# Patient Record
Sex: MALE | Race: BLACK OR AFRICAN AMERICAN | NOT HISPANIC OR LATINO | URBAN - METROPOLITAN AREA
[De-identification: names, ages, dates, MRNs, and addresses within clinical notes are randomized per-mention and may not be internally consistent; named-entity substitution may affect disease eponyms.]

---

## 2024-07-22 ENCOUNTER — EMERGENCY (EMERGENCY)
Facility: HOSPITAL | Age: 64
LOS: 1 days | Discharge: HOME | End: 2024-07-22
Attending: STUDENT IN AN ORGANIZED HEALTH CARE EDUCATION/TRAINING PROGRAM
Payer: COMMERCIAL

## 2024-07-22 VITALS
WEIGHT: 195.11 LBS | HEART RATE: 55 BPM | TEMPERATURE: 98 F | HEIGHT: 72 IN | RESPIRATION RATE: 18 BRPM | SYSTOLIC BLOOD PRESSURE: 142 MMHG | OXYGEN SATURATION: 99 % | DIASTOLIC BLOOD PRESSURE: 67 MMHG

## 2024-07-22 PROCEDURE — 99053 MED SERV 10PM-8AM 24 HR FAC: CPT

## 2024-07-22 PROCEDURE — 99284 EMERGENCY DEPT VISIT MOD MDM: CPT

## 2024-07-22 PROCEDURE — 99283 EMERGENCY DEPT VISIT LOW MDM: CPT

## 2024-07-22 RX ORDER — ACETAMINOPHEN 325 MG
975 TABLET ORAL ONCE
Refills: 0 | Status: COMPLETED | OUTPATIENT
Start: 2024-07-22 | End: 2024-07-22

## 2024-07-22 RX ORDER — LIDOCAINE HCL 28 MG/G
1 GEL TOPICAL ONCE
Refills: 0 | Status: COMPLETED | OUTPATIENT
Start: 2024-07-22 | End: 2024-07-22

## 2024-07-22 RX ADMIN — LIDOCAINE HCL 1 PATCH: 28 GEL TOPICAL at 05:56

## 2024-07-22 NOTE — ED PROVIDER NOTE - IN ACCORDANCE WITH [place] STATE LAW, WE OFFER EVERY PATIENT A HEPATITIS C TEST. WOULD YOU LIKE TO BE TESTED TODAY?
Care discussed with pharmacist at approximately 3:10 PM.  Call to and message left for the patient at approximately 3:18 PM.  Requesting callback from the patient.
Discussed with pharmacy.  The patient has intermediate sensitivity of the ciprofloxacin and she was discharged on levofloxacin.  Pharmacy recommending that if she is improving that she can finish the course of Levaquin and that if she is not improving adequately that she can use Keflex 500 4 times daily for 10 days.  Call to the patient and message left and then call back for the patient and reviewed with her at approximately 4:15 PM.  She describes that she was feeling nauseous but that is improved and no longer nauseous today.  She had a feeling of body aches and headache which are improved.  There is no longer any feeling of back pain.  She is still having tactile fevers and therefore has been using ibuprofen.  Overall in review with patient it sounds like she is improving but we will change over to Keflex especially given ongoing tactile fevers.  I called over to the [facility] in [place] at phone number [phone] and leave message at 4:15 PM for Keflex 500 4 times daily for 10 days.The patient was instructed to return to the emergency department for any worse or concerning symptoms including pain, fever, nausea, if she feels sick, if anything feels like it is getting worse or for any other concern at any time.  The patient verbalizes understanding and is comfortable with this plan.  She will stop the levofloxacin and begin the Keflex.
Opt out

## 2024-07-22 NOTE — ED PROVIDER NOTE - PHYSICAL EXAMINATION
GENERAL: no acute distress, non-toxic appearing  HEAD: normocephalic, atraumatic  HEENT: oral mucosa moist, full ROM of neck  CARDIAC: regular rate rhythm, normal S1/S2  CHEST: CTA BL, no wheeze or crackles  ABDOMEN: normal BS, soft, no tenderness  EXTREMITY: no gross deformity, no edema, good perfusion   MSK: no midline spinal tenderness throughout. Paraspinal muscle tenderness in mid back   NEURO: alert and orientedx3 GENERAL: no acute distress, non-toxic appearing  HEAD: normocephalic, atraumatic  HEENT: oral mucosa moist, full ROM of neck  CARDIAC: regular rate rhythm, normal S1/S2  CHEST: CTA BL, no wheeze or crackles  ABDOMEN: normal BS, soft, no tenderness  EXTREMITY: no gross deformity, no edema, good perfusion   MSK: no midline spinal tenderness throughout. +Paraspinal muscle tenderness in mid back   NEURO: alert and orientedx3
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
## 2024-07-22 NOTE — ED PROVIDER NOTE - NSFOLLOWUPINSTRUCTIONS_ED_ALL_ED_FT
Please  the muscle relaxer Flexeril and take it up to 3 times a day for worsening back spasms.    You can use 500-1000mg Tylenol every 6 hours for pain - as needed.  This is an over-the-counter medications - please respect the warnings on the label. This medication come with certain risks and side effects that you need to discuss with your doctor, especially if you are taking it for a prolonged period.    You can use 400-600mg Ibuprofen (such as motrin or advil) every 6 to 8 hours as needed for pain control.  Take ibuprofen with food or milk to lessen stomach upset.  This is an over-the-counter medication please respect the warnings on the label. All medications come with certain risks and side effects that you need to discuss with your doctor, especially if you are taking them for a prolonged period.    You can apply lidocaine patches as needed for your back pain. These are available over the counter at any pharmacy.    Back Pain    Back pain is very common in adults. The cause of back pain is rarely dangerous and the pain often gets better over time. The cause of your back pain may not be known and may include strain of muscles or ligaments, degeneration of the spinal disks, or arthritis. Occasionally the pain may radiate down your leg(s). Over-the-counter medicines to reduce pain and inflammation are often the most helpful. Stretching and remaining active frequently helps the healing process.     SEEK IMMEDIATE MEDICAL CARE IF YOU HAVE ANY OF THE FOLLOWING SYMPTOMS: bowel or bladder control problems, unusual weakness or numbness in your arms or legs, nausea or vomiting, abdominal pain, fever, dizziness/lightheadedness.
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
## 2024-07-22 NOTE — ED PROVIDER NOTE - PATIENT PORTAL LINK FT
You can access the FollowMyHealth Patient Portal offered by [facility] by registering at the following website: [email or web address]. By joining [employer]’s FollowMyHealth portal, you will also be able to view your health information using other applications (apps) compatible with our system.
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
## 2025-08-02 ENCOUNTER — EMERGENCY (EMERGENCY)
Facility: HOSPITAL | Age: 65
LOS: 1 days | End: 2025-08-02
Attending: STUDENT IN AN ORGANIZED HEALTH CARE EDUCATION/TRAINING PROGRAM
Payer: MEDICAID

## 2025-08-02 VITALS
TEMPERATURE: 98 F | WEIGHT: 195.11 LBS | OXYGEN SATURATION: 98 % | RESPIRATION RATE: 16 BRPM | HEIGHT: 72 IN | HEART RATE: 55 BPM | DIASTOLIC BLOOD PRESSURE: 84 MMHG | SYSTOLIC BLOOD PRESSURE: 132 MMHG

## 2025-08-02 PROCEDURE — 99283 EMERGENCY DEPT VISIT LOW MDM: CPT | Mod: 25

## 2025-08-02 PROCEDURE — 90715 TDAP VACCINE 7 YRS/> IM: CPT

## 2025-08-02 PROCEDURE — 99283 EMERGENCY DEPT VISIT LOW MDM: CPT

## 2025-08-02 PROCEDURE — 90471 IMMUNIZATION ADMIN: CPT

## 2025-08-02 RX ORDER — CEFPODOXIME PROXETIL 200 MG/1
2 TABLET, FILM COATED ORAL
Qty: 20 | Refills: 0
Start: 2025-08-02 | End: 2025-08-06

## 2025-08-02 RX ORDER — SULFAMETHOXAZOLE/TRIMETHOPRIM 800-160 MG
1 TABLET ORAL ONCE
Refills: 0 | Status: COMPLETED | OUTPATIENT
Start: 2025-08-02 | End: 2025-08-02

## 2025-08-02 RX ORDER — CEFPODOXIME PROXETIL 200 MG/1
400 TABLET, FILM COATED ORAL ONCE
Refills: 0 | Status: COMPLETED | OUTPATIENT
Start: 2025-08-02 | End: 2025-08-02

## 2025-08-02 RX ORDER — SULFAMETHOXAZOLE/TRIMETHOPRIM 800-160 MG
1 TABLET ORAL
Qty: 10 | Refills: 0
Start: 2025-08-02 | End: 2025-08-06

## 2025-08-02 RX ADMIN — CEFPODOXIME PROXETIL 400 MILLIGRAM(S): 200 TABLET, FILM COATED ORAL at 05:29

## 2025-08-02 RX ADMIN — Medication 1 TABLET(S): at 05:29
